# Patient Record
(demographics unavailable — no encounter records)

---

## 2024-11-29 NOTE — PHYSICAL EXAM
[Well Developed] : well developed [NAD] : in no acute distress [Thin] : thin [PERRL] : pupils were equal, round, reactive to light  [Moist & Pink Mucous Membranes] : moist and pink mucous membranes [CTAB] : lungs clear to auscultation bilaterally [Regular Rate and Rhythm] : regular rate and rhythm [Normal S1, S2] : normal S1 and S2 [Soft] : soft  [Normal Bowel Sounds] : normal bowel sounds [No HSM] : no hepatosplenomegaly appreciated [Norman Stage ___] : Norman stage [unfilled] [Normal Tone] : normal tone [Well-Perfused] : well-perfused [Interactive] : interactive [icteric] : anicteric [Respiratory Distress] : no respiratory distress  [Distended] : non distended [Tender] : non tender [Edema] : no edema [Cyanosis] : no cyanosis [Rash] : no rash [Jaundice] : no jaundice

## 2024-11-29 NOTE — ASSESSMENT
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

## 2024-11-29 NOTE — CONSULT LETTER
[Dear  ___] : Dear  [unfilled], [Consult Letter:] : I had the pleasure of evaluating your patient, [unfilled]. [Please see my note below.] : Please see my note below. [Consult Closing:] : Thank you very much for allowing me to participate in the care of this patient.  If you have any questions, please do not hesitate to contact me. [Sincerely,] : Sincerely, [FreeTextEntry3] : Jim Ag MD Division of Pediatric Gastroenterology [facility]

## 2024-11-29 NOTE — REASON FOR VISIT
[Consultation Follow Up] : a consultation follow up  [Medical Records] : medical records [Patient] : patient [Mother] : mother